# Patient Record
Sex: FEMALE | Race: AMERICAN INDIAN OR ALASKA NATIVE
[De-identification: names, ages, dates, MRNs, and addresses within clinical notes are randomized per-mention and may not be internally consistent; named-entity substitution may affect disease eponyms.]

---

## 2017-06-21 ENCOUNTER — HOSPITAL ENCOUNTER (EMERGENCY)
Dept: HOSPITAL 5 - ED | Age: 27
Discharge: HOME | End: 2017-06-21
Payer: COMMERCIAL

## 2017-06-21 VITALS — SYSTOLIC BLOOD PRESSURE: 110 MMHG | DIASTOLIC BLOOD PRESSURE: 81 MMHG
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
## 2017-06-21 DIAGNOSIS — R51: ICD-10-CM

## 2017-06-21 DIAGNOSIS — M62.830: ICD-10-CM

## 2017-06-21 DIAGNOSIS — J45.909: ICD-10-CM
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
## 2017-06-21 DIAGNOSIS — M54.5: Primary | ICD-10-CM

## 2017-06-21 PROCEDURE — 99282 EMERGENCY DEPT VISIT SF MDM: CPT
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
## 2019-08-28 ENCOUNTER — HOSPITAL ENCOUNTER (EMERGENCY)
Dept: HOSPITAL 5 - ED | Age: 29
Discharge: HOME | End: 2019-08-28
Payer: MEDICAID

## 2019-08-28 VITALS — DIASTOLIC BLOOD PRESSURE: 78 MMHG | SYSTOLIC BLOOD PRESSURE: 121 MMHG

## 2019-08-28 DIAGNOSIS — J45.909: ICD-10-CM

## 2019-08-28 DIAGNOSIS — Z88.1: ICD-10-CM

## 2019-08-28 DIAGNOSIS — L02.414: Primary | ICD-10-CM

## 2019-08-28 DIAGNOSIS — Z79.899: ICD-10-CM

## 2019-08-28 PROCEDURE — 99282 EMERGENCY DEPT VISIT SF MDM: CPT

## 2019-08-28 NOTE — EMERGENCY DEPARTMENT REPORT
ED General Adult HPI





- General


Chief complaint: Extremity Injury, Upper


Stated complaint: SPIDER BITE


Time Seen by Provider: 08/28/19 09:34


Source: patient


Mode of arrival: Ambulatory


Limitations: No Limitations





- History of Present Illness


Initial comments: 





Patient presents to emergency department for she thinks is a spider bite to her 

left forearm.  Patient states that she does not remember a spider bite her but 

noticed a bump on her forearm yesterday and became concerned


-: Sudden


Improves with: none


Worsens with: none


Associated Symptoms: denies other symptoms


Treatments Prior to Arrival: none





- Related Data


                                  Previous Rx's











 Medication  Instructions  Recorded  Last Taken  Type


 


metroNIDAZOLE [Flagyl] 500 mg PO BID #14 tablet 09/08/13 Unknown Rx


 


Tobramycin 0.3% [Tobrex] 1 drop OU Q4HR #1 bottle 05/09/16 Unknown Rx


 


Cyclobenzaprine [Flexeril] 10 mg PO TID PRN #15 tablet 06/21/17 Unknown Rx


 


Ibuprofen [Motrin] 600 mg PO Q8H PRN #15 tablet 06/21/17 Unknown Rx


 


cephALEXin [Keflex] 500 mg PO Q6HR #28 capsule 08/28/19 Unknown Rx











                                    Allergies











Allergy/AdvReac Type Severity Reaction Status Date / Time


 


clindamycin Allergy  Hives Verified 08/28/19 08:34














ED Review of Systems


ROS: 


Stated complaint: SPIDER BITE


Other details as noted in HPI





Comment: All other systems reviewed and negative


Constitutional: denies: chills, fever


Eyes: denies: eye pain, eye discharge, vision change


ENT: denies: ear pain, throat pain


Respiratory: denies: cough, shortness of breath, wheezing


Cardiovascular: denies: chest pain, palpitations


Endocrine: no symptoms reported


Gastrointestinal: denies: abdominal pain, nausea, diarrhea


Genitourinary: denies: urgency, dysuria, discharge


Musculoskeletal: denies: back pain, joint swelling, arthralgia


Skin: denies: rash, lesions


Neurological: denies: headache, weakness, paresthesias


Psychiatric: denies: anxiety, depression


Hematological/Lymphatic: denies: easy bleeding, easy bruising





ED Past Medical Hx





- Past Medical History


Hx Hypertension: No


Hx Congestive Heart Failure: No


Hx Diabetes: No


Hx Deep Vein Thrombosis: No


Hx Renal Disease: No


Hx Sickle Cell Disease: No


Hx Seizures: No


Hx Asthma: Yes (as a child)


Hx COPD: No


Hx HIV: No





- Surgical History


Additional Surgical History: back surgery for scolosis





- Social History


Smoking Status: Never Smoker


Substance Use Type: None





- Medications


Home Medications: 


                                Home Medications











 Medication  Instructions  Recorded  Confirmed  Last Taken  Type


 


metroNIDAZOLE [Flagyl] 500 mg PO BID #14 tablet 09/08/13 08/18/16 Unknown Rx


 


Tobramycin 0.3% [Tobrex] 1 drop OU Q4HR #1 bottle 05/09/16 08/18/16 Unknown Rx


 


Cyclobenzaprine [Flexeril] 10 mg PO TID PRN #15 tablet 06/21/17  Unknown Rx


 


Ibuprofen [Motrin] 600 mg PO Q8H PRN #15 tablet 06/21/17  Unknown Rx


 


cephALEXin [Keflex] 500 mg PO Q6HR #28 capsule 08/28/19  Unknown Rx














ED Physical Exam





- General


Limitations: No Limitations


General appearance: alert, in no apparent distress





- Head


Head exam: Present: atraumatic, normocephalic





- Eye


Eye exam: Present: normal appearance





- ENT


ENT exam: Present: mucous membranes moist





- Neck


Neck exam: Present: normal inspection





- Respiratory


Respiratory exam: Present: normal lung sounds bilaterally.  Absent: respiratory 

distress





- Cardiovascular


Cardiovascular Exam: Present: regular rate, normal rhythm.  Absent: systolic 

murmur, diastolic murmur, rubs, gallop





- Extremities Exam


Extremities exam: Present: normal inspection, other (physical small abscess that

 is draining to the left antebrachium palmar aspect)





- Back Exam


Back exam: Present: normal inspection





- Neurological Exam


Neurological exam: Present: alert, oriented X3, CN II-XII intact.  Absent: motor

 sensory deficit





- Psychiatric


Psychiatric exam: Present: normal affect, normal mood





- Skin


Skin exam: Present: warm, dry, intact, normal color.  Absent: rash





ED Course





                                   Vital Signs











  08/28/19





  08:46


 


Temperature 97.8 F


 


Pulse Rate 83


 


Respiratory 16





Rate 


 


Blood Pressure 124/80


 


O2 Sat by Pulse 100





Oximetry 














ED Medical Decision Making





- Medical Decision Making





Abscess does not require I and D


Critical care attestation.: 


If time is entered above; I have spent that time in minutes in the direct care 

of this critically ill patient, excluding procedure time.








ED Disposition


Clinical Impression: 


 Abscess





Disposition: DC-01 TO HOME OR SELFCARE


Is pt being admited?: No


Does the pt Need Aspirin: No


Condition: Stable


Instructions:  Abscess (ED)


Additional Instructions: 


return if worse


Prescriptions: 


cephALEXin [Keflex] 500 mg PO Q6HR #28 capsule


Referrals: 


[facility],MD [Primary Care Provider] - 3-5 Days


[facility] [Provider Group] - 3-5 Days


[facility] [Provider Group] - 3-5 Days


Time of Disposition: 09:47

## 2019-12-11 ENCOUNTER — HOSPITAL ENCOUNTER (OUTPATIENT)
Dept: HOSPITAL 5 - TRG | Age: 29
Discharge: HOME | End: 2019-12-11
Attending: OBSTETRICS & GYNECOLOGY
Payer: MEDICAID

## 2019-12-11 VITALS — SYSTOLIC BLOOD PRESSURE: 110 MMHG | DIASTOLIC BLOOD PRESSURE: 70 MMHG

## 2019-12-11 DIAGNOSIS — Z3A.22: ICD-10-CM

## 2019-12-11 DIAGNOSIS — O99.512: ICD-10-CM
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
## 2019-12-11 DIAGNOSIS — O47.02: Primary | ICD-10-CM

## 2019-12-11 DIAGNOSIS — J45.909: ICD-10-CM
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
## 2019-12-11 LAB
BILIRUB UR QL STRIP: (no result)
BLOOD UR QL VISUAL: (no result)
PH UR STRIP: 7 [PH] (ref 5–7)
PROT UR STRIP-MCNC: (no result) MG/DL
RBC #/AREA URNS HPF: 3 /HPF (ref 0–6)
UROBILINOGEN UR-MCNC: < 2 MG/DL (ref ?–2)
WBC #/AREA URNS HPF: 6 /HPF (ref 0–6)

## 2019-12-11 PROCEDURE — 76815 OB US LIMITED FETUS(S): CPT

## 2019-12-11 PROCEDURE — 76817 TRANSVAGINAL US OBSTETRIC: CPT

## 2019-12-11 PROCEDURE — 81001 URINALYSIS AUTO W/SCOPE: CPT

## 2019-12-11 PROCEDURE — 96360 HYDRATION IV INFUSION INIT: CPT

## 2019-12-11 PROCEDURE — 96361 HYDRATE IV INFUSION ADD-ON: CPT

## 2019-12-11 NOTE — ULTRASOUND REPORT
Limited OB Ultrasound



HISTORY: f/u fall.



TECHNIQUE:  Grayscale and color Doppler  imaging performed.



COMPARISON:  None



FINDINGS: There is a single viable intrauterine gestation which is cephalic in presentation with hear
t rate of 149 bpm. Placenta is positioned anteriorly. No acute abnormality identified. Cervical lengt
h is 4.9 cm.



IMPRESSION: No acute abnormality. Single viable intrauterine gestation as above.



Signer Name: Alexsander Ramirez MD 

Signed: 12/11/2019 5:34 PM

 [facility]: [id number]

## 2019-12-12 NOTE — ULTRASOUND REPORT
Ultrasound OB transvaginal



HISTORY: Status post fall.



FINDINGS: This exam is just presented to me for interpretation. A single transvaginal ultrasound imag
es of the cervix is presented. The cervix measures 4.9 cm in length.



Signer Name: Hebert Womack Jr, MD 

Signed: 12/12/2019 8:27 AM

 Workstation Name: [id number]

## 2020-04-03 NOTE — ANESTHESIA CONSULTATION
Anesthesia Consult and Med Hx


Date of service: 04/03/20





- Airway


Anesthetic Teeth Evaluation: Good


ROM Head & Neck: Adequate


Mental/Hyoid Distance: Adequate


Mallampati Class: Class II


Intubation Access Assessment: Good





- Pulmonary Exam


CTA: Yes





- Cardiac Exam


Cardiac Exam: RRR





- Pre-Operative Health Status


ASA Pre-Surgery Classification: ASA2, Emergency


Proposed Anesthetic Plan: Epidural





- Pulmonary


Hx Asthma: Yes (as a child)


COPD: No


Hx Pneumonia: No





- Cardiovascular System


Hx Hypertension: No





- Central Nervous System


Hx Seizures: No


Hx Psychiatric Problems: No





- Endocrine


Hx Renal Disease: No


Hx End Stage Renal Disease: No


Hx Hypothyroidism: No


Hx Hyperthyroidism: No





- Hematic


Hx Anemia: Yes


Hx Sickle Cell Disease: No





- Other Systems


Hx Alcohol Use: No

## 2020-04-03 NOTE — HISTORY AND PHYSICAL REPORT
History of Present Illness


Date of examination: 20


Date of admission: 


20 09:49





Chief complaint: 





Irregular ctxs


History of present illness: 





28 yo,  at 38.1 wks, initiated care with [facility] at 12.5 wks 

gestation.  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

35 wks), Scoliosis, Vit D deficiency and GBS positive status.  She presents to 

[facility] with reports of irregular painful ctxs since last night.  She reports +FM. 

Denies VB or LOF.





Labs:


O+, antibody negative; PAP smear normal; Rubella imuune; VDRL non-reactive; 

urine screen negative; HBsAg negative; HIV negative; platelets - 297K; Vit D - 

20.9; varicella immune; HCV Ab negative; MSAFP negative; 1 hr gtt - 151; 3 hr 

gtt: 87;128;97;90; GBS positive.





Past History


Past Medical History: no pertinent history


Past Surgical History: other (Elective  - ; Back surgery - )


GYN History: chlamydia (2012), herpes


Family/Genetic History: diabetes (MGM), hypertension (mother)


Social history: single, lives with family, full code.  denies: smoking, alcohol 

abuse, prescription drug abuse, IV drug use





- Obstetrical History


Expected Date of Delivery: 20


Actual Gestation: 38 Week(s) 3 Day(s) 


: 5


Para: 3


Hx # Term Pregnancies: 3


Number of  Pregnancies: 0


Spontaneous Abortions: 0


Induced : 1


Number of Living Children: 3


  ** #1


Infant Gender: Female


Birth year: [birth year]


Birthweight: 2.931 kg


Method of Delivery: Vaginal


Gestational age at delivery: 39


Complications: none





  ** #2


Infant Gender: Male


Birth year: [birth year]


Birthweight: 3.345 kg


Method of Delivery: Vaginal


Gestational age at delivery: 37


Complications: none





  ** #3


Infant Gender: Male


Birth year: [birth year]


Birthweight: 3.43 kg


Method of Delivery: Vaginal


Gestational age at delivery: 37


Complications: none





Medications and Allergies


                                    Allergies











Allergy/AdvReac Type Severity Reaction Status Date / Time


 


clindamycin AdvReac Severe Hives Verified 19 15:23











                                Home Medications











 Medication  Instructions  Recorded  Confirmed  Last Taken  Type


 


metroNIDAZOLE [Flagyl] 500 mg PO BID #14 tablet 13 Unknown Rx


 


Tobramycin 0.3% [Tobrex] 1 drop OU Q4HR #1 bottle 16 Unknown Rx


 


Cyclobenzaprine [Flexeril] 10 mg PO TID PRN #15 tablet 17  Unknown Rx


 


Ibuprofen [Motrin] 600 mg PO Q8H PRN #15 tablet 17  Unknown Rx


 


cephALEXin [Keflex] 500 mg PO Q6HR #28 capsule 19  Unknown Rx











Active Meds: 


Active Medications





Butorphanol Tartrate (Stadol)  2 mg IV Q2H PRN


   PRN Reason: Pain , Severe (7-10)


Ephedrine Sulfate (Ephedrine Sulfate)  10 mg IV Q2M PRN


   PRN Reason: Hypotension


Fentanyl (Sublimaze)  100 mcg IV Q2H PRN


   PRN Reason: Labor Pain


Oxytocin/Sodium Chloride (Pitocin/Ns 20 Unit/1000ml Drip)  20 units in 1,000 mls

 @ 125 mls/hr IV AS DIRECT MARIPOSA


Lactated Ringer's (Lactated Ringers)  1,000 mls @ 125 mls/hr IV AS DIRECT MARIPOSA


Ampicillin Sodium (Ampicillin/Ns 2 Gm/100 Ml)  2 gm in 100 mls @ 100 mls/hr IV 

ONCE ONE; Protocol


   Stop: 20 11:53


Ampicillin Sodium (Ampicillin/Ns 1 Gm/50 Ml)  1 gm in 50 mls @ 100 mls/hr IV 

Q4HR MARIPOSA; Protocol


Mineral Oil (Mineral Oil)  30 ml PO QHS PRN


   PRN Reason: Constipation


Ondansetron HCl (Zofran)  4 mg IV Q8H PRN


   PRN Reason: Nausea And Vomiting


Terbutaline Sulfate (Brethine)  0.25 mg SUB-Q ONCE PRN


   PRN Reason: Hyperstimulation/Hypertonicity


Terbutaline Sulfate (Brethine)  0.25 mg IVP ONCE PRN


   PRN Reason: Hyperstimulation/Hypertonicity











Review of Systems


All systems: negative


Genitourinary: contractions





- Vital Signs


Vital signs: 


                                   Vital Signs











Pulse BP


 


 96 H  106/70 


 


 20 08:51  20 08:51








                                        











Temp Pulse Resp BP Pulse Ox


 


 98.2 F   93 H  16   111/75   96 


 


 20 09:09  20 11:26  20 09:09  20 09:52  20 11:26














- Physical Exam


Breasts: Positive: normal


Cardiovascular: Regular rate


Lungs: Positive: Normal air movement


Abdomen: Positive: other (gravid)


Genitourinary (Female): Positive: normal external genitalia, normal perenium


Vagina: Positive: normal moisture


Uterus: Positive: enlarged (S=D)


Extremities: Positive: normal


Deep Tendon Reflex Grade: Normal +2





- Obstetrical


FHR: category 1


Uterine Contraction Monitor Mode: External


Cervical Dilatation: 3


Cervical Effacement Percentage: 70


Fetal station: -2


Uterine Contraction Pattern: Irregular


Uterine Tone Measurement Phase: Resting


Uterine Contraction Intensity: Mild





Results


Result Diagrams: 


                                 20 11:24





All other labs normal.








Assessment and Plan





- Patient Problems


(1) 38 weeks gestation of pregnancy


Current Visit: Yes   Status: Acute   


Plan to address problem: 


Admit to L & D


 Expectant labor management


 Pain meds as expected


 Anticipate 








(2) Positive GBS test


Current Visit: Yes   Status: Acute   


Plan to address problem: 


Initiate GBS prophylaxis per protocol








(3) HSV-2 seropositive


Current Visit: Yes   Status: Acute   





(4) Scoliosis


Current Visit: Yes   Status: Acute

## 2020-04-03 NOTE — PROGRESS NOTE
Assessment and Plan





- Patient Problems


(1) 38 weeks gestation of pregnancy


Current Visit: Yes   Status: Acute   


Plan to address problem: 


Expectant labor management


 Ambulate in hallway after second dose of ABT infused


 Pain meds as expected


 Anticipate 








(2) Positive GBS test


Current Visit: Yes   Status: Acute   


Plan to address problem: 


Continue GBS prophylaxis per protocol








(3) HSV-2 seropositive


Current Visit: Yes   Status: Acute   





(4) Scoliosis


Current Visit: Yes   Status: Acute   





Subjective





- Subjective


Date of service: 20


Principal diagnosis: labor; 38 wks gestation


Interval history: 





28 yo,  at 38.1 wks, initiated care with [facility] at 12.5 wks 

gestation.  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

35 wks), Scoliosis, Vit D deficiency and GBS positive status.  She presents to 

[facility] with reports of irregular painful ctxs since last night.  She reports +FM. 

Denies VB or LOF.





Labs:


O+, antibody negative; PAP smear normal; Rubella imuune; VDRL non-reactive; 

urine screen negative; HBsAg negative; HIV negative; platelets - 297K; Vit D - 

20.9; varicella immune; HCV Ab negative; MSAFP negative; 1 hr gtt - 151; 3 hr 

gtt: 87;128;97;90; GBS positive.


Patient reports: new complaints, fetal movement normal, contractions ("I feel th

em, but they aren't real painful"), no vaginal bleeding





Objective





- Vital Signs


Vital Signs: 


                               Vital Signs - 12hr











  20





  08:51 08:52 08:57


 


Temperature   


 


Pulse Rate 96 H 102 H 99 H


 


Respiratory   





Rate   


 


Blood Pressure 106/70  


 


O2 Sat by Pulse  100 100





Oximetry   














  20





  09:02 09:07 09:09


 


Temperature   98.2 F


 


Pulse Rate 100 H 96 H 


 


Respiratory   16





Rate   


 


Blood Pressure   


 


O2 Sat by Pulse 100 100 





Oximetry   














  20





  09:12 09:17 09:21


 


Temperature   


 


Pulse Rate 104 H 99 H 96 H


 


Respiratory   





Rate   


 


Blood Pressure   


 


O2 Sat by Pulse 100 100 93





Oximetry   














  20





  09:22 09:27 09:30


 


Temperature   


 


Pulse Rate 96 H 94 H 94 H


 


Respiratory   





Rate   


 


Blood Pressure   


 


O2 Sat by Pulse 99 97 94





Oximetry   














  20





  09:32 09:37 09:40


 


Temperature   


 


Pulse Rate 97 H 93 H 101 H


 


Respiratory   





Rate   


 


Blood Pressure   


 


O2 Sat by Pulse 95 98 91





Oximetry   














  20





  09:51 09:52 09:55


 


Temperature   


 


Pulse Rate 101 H 95 H 98 H


 


Respiratory   





Rate   


 


Blood Pressure  111/75 


 


O2 Sat by Pulse 98  99





Oximetry   














  20





  10:01 10:05 10:10


 


Temperature   


 


Pulse Rate 94 H 96 H 95 H


 


Respiratory   





Rate   


 


Blood Pressure   


 


O2 Sat by Pulse 97 97 97





Oximetry   














  20





  10:16 10:21 10:25


 


Temperature   


 


Pulse Rate 99 H 94 H 101 H


 


Respiratory   





Rate   


 


Blood Pressure   


 


O2 Sat by Pulse 97 97 98





Oximetry   














  20





  10:31 10:36 10:40


 


Temperature   


 


Pulse Rate 94 H 96 H 99 H


 


Respiratory   





Rate   


 


Blood Pressure   


 


O2 Sat by Pulse 98 98 96





Oximetry   














  20





  10:46 10:51 10:54


 


Temperature   


 


Pulse Rate 98 H 89 99 H


 


Respiratory   





Rate   


 


Blood Pressure   


 


O2 Sat by Pulse 96 96 94





Oximetry   














  20





  10:55 11:02 11:06


 


Temperature   


 


Pulse Rate 95 H 98 H 107 H


 


Respiratory   





Rate   


 


Blood Pressure   


 


O2 Sat by Pulse 95 97 95





Oximetry   














  20





  11:11 11:13 11:16


 


Temperature   


 


Pulse Rate 112 H 113 H 105 H


 


Respiratory   





Rate   


 


Blood Pressure   


 


O2 Sat by Pulse 95 93 97





Oximetry   














  20





  11:22 11:26 11:31


 


Temperature   


 


Pulse Rate 97 H 93 H 95 H


 


Respiratory   





Rate   


 


Blood Pressure   


 


O2 Sat by Pulse 94 96 97





Oximetry   














  20





  11:36 11:37 11:42


 


Temperature   


 


Pulse Rate 95 H 104 H 108 H


 


Respiratory   





Rate   


 


Blood Pressure   


 


O2 Sat by Pulse 98 87 96





Oximetry   














  20





  11:46 12:02 12:06


 


Temperature   


 


Pulse Rate 98 H 111 H 107 H


 


Respiratory   





Rate   


 


Blood Pressure   


 


O2 Sat by Pulse 95 98 97





Oximetry   














  20





  12:11 12:16 12:21


 


Temperature   


 


Pulse Rate 101 H 104 H 106 H


 


Respiratory   





Rate   


 


Blood Pressure   


 


O2 Sat by Pulse 97 97 97





Oximetry   














  20





  12:26 12:32 12:36


 


Temperature   


 


Pulse Rate 99 H 100 H 104 H


 


Respiratory   





Rate   


 


Blood Pressure   


 


O2 Sat by Pulse 97 97 98





Oximetry   














  20





  12:41 12:46 12:51


 


Temperature   


 


Pulse Rate 103 H 101 H 105 H


 


Respiratory   





Rate   


 


Blood Pressure   


 


O2 Sat by Pulse 97 96 97





Oximetry   














  20





  12:56 13:01 13:07


 


Temperature   


 


Pulse Rate 104 H 104 H 105 H


 


Respiratory   





Rate   


 


Blood Pressure   


 


O2 Sat by Pulse 95 98 96





Oximetry   














  20





  13:11 13:17 13:21


 


Temperature   


 


Pulse Rate 101 H 111 H 108 H


 


Respiratory   





Rate   


 


Blood Pressure   


 


O2 Sat by Pulse 97 97 97





Oximetry   














  20





  13:26 13:31 13:36


 


Temperature   


 


Pulse Rate 107 H 102 H 101 H


 


Respiratory   





Rate   


 


Blood Pressure   


 


O2 Sat by Pulse 97 96 96





Oximetry   














  20





  13:41 13:46 13:51


 


Temperature   


 


Pulse Rate 104 H 105 H 103 H


 


Respiratory   





Rate   


 


Blood Pressure   


 


O2 Sat by Pulse 97 97 96





Oximetry   














  20





  13:56 14:01 14:06


 


Temperature   


 


Pulse Rate 108 H 113 H 113 H


 


Respiratory   





Rate   


 


Blood Pressure   


 


O2 Sat by Pulse 97 97 97





Oximetry   














  20





  14:11 14:16 14:21


 


Temperature   


 


Pulse Rate 98 H 101 H 104 H


 


Respiratory   





Rate   


 


Blood Pressure   


 


O2 Sat by Pulse 97 97 95





Oximetry   














  20





  14:26 14:31 14:36


 


Temperature   


 


Pulse Rate 107 H 109 H 104 H


 


Respiratory   





Rate   


 


Blood Pressure   


 


O2 Sat by Pulse 96 97 97





Oximetry   














  20





  14:41 14:46 14:51


 


Temperature   


 


Pulse Rate 110 H 96 H 107 H


 


Respiratory   





Rate   


 


Blood Pressure   


 


O2 Sat by Pulse 98 98 97





Oximetry   














  20





  14:56 15:01 15:06


 


Temperature   


 


Pulse Rate 107 H 96 H 93 H


 


Respiratory   





Rate   


 


Blood Pressure   


 


O2 Sat by Pulse 95 97 96





Oximetry   














  20





  15:11 15:18 15:23


 


Temperature   


 


Pulse Rate 117 H 85 99 H


 


Respiratory   





Rate   


 


Blood Pressure   


 


O2 Sat by Pulse 96 98 97





Oximetry   














  20





  15:28 15:33 15:38


 


Temperature   


 


Pulse Rate 92 H 98 H 92 H


 


Respiratory   





Rate   


 


Blood Pressure   


 


O2 Sat by Pulse 96 97 96





Oximetry   














  20





  15:43 15:48 15:53


 


Temperature   


 


Pulse Rate 93 H 87 120 H


 


Respiratory   





Rate   


 


Blood Pressure   


 


O2 Sat by Pulse 95 96 98





Oximetry   














  20





  15:58 16:03 16:08


 


Temperature   


 


Pulse Rate 95 H 96 H 99 H


 


Respiratory   





Rate   


 


Blood Pressure   


 


O2 Sat by Pulse 97 98 97





Oximetry   














  20





  16:13


 


Temperature 


 


Pulse Rate 97 H


 


Respiratory 





Rate 


 


Blood Pressure 


 


O2 Sat by Pulse 96





Oximetry 














- Exam


Breasts: deferred


Cardiovascular: Regular rate


Lungs: Normal air movement


FHR: category 1


Uterine Contraction Monitor Mode: External


Cervical Dilatation: 4.5 (vertex)


Cervical Effacement Percentage: 70


Fetal station: -2


Uterine Contraction Frequency (min): 4-5


Uterine Contraction Pattern: Irregular


Uterine Tone Measurement Phase: Resting


Uterine Contraction Intensity: Mild


Extremities: normal





- Labs


Labs: 


                                  Abnormal Labs











  20





  11:24


 


RBC  3.53 L


 


MCV  100 H


 


MCH  35 H


 


MCHC  35 H


 


RDW  13.1 L








                         Laboratory Results - last 24 hr











  20





  11:24 11:26


 


WBC  8.0 


 


RBC  3.53 L 


 


Hgb  12.5 


 


Hct  35.3 


 


MCV  100 H 


 


MCH  35 H 


 


MCHC  35 H 


 


RDW  13.1 L 


 


Plt Count  182 


 


Blood Type   O POSITIVE


 


Antibody Screen   Negative

## 2020-04-03 NOTE — PROGRESS NOTE
Labor Epidural





- Labor Epidural


Start Time: 21:01


Stop Time: 21:08


Performed by:: mick


Procedure: 


Patient is requesting a laboring epidural for laboring pain.  Patient IDed, H&P 

reviewed, all questions and concerns were answered, and consent was signed. 

Timeout was performed at bedside.  Patient in sitting position.  Sterile prep 

and drape was performed.  [3] ml of 1% lidocaine skin wheal at L[3]- L [4].  18-

gauge Touhy epidural needle was advanced to loss of resistance with air 

technique.  Negative CSF negative blood.  Epidural catheter advanced to [15] 

centimeters.  [-] Aspiration [-] test dose.  Sterile dressing applied.  Patient 

tolerated procedure.

## 2020-04-03 NOTE — PROCEDURE NOTE
OB Delivery Note





- Delivery


Date of Delivery: 20


Surgeon: DWIGHT ADAMS


Estimated blood loss: 300cc





- Vaginal


Delivery presentation: vertex


Delivery position: OA


Delivery induction: none


Delivery augmentation: rupture of membranes


Delivery monitor: external FHT, external uterine


Route of delivery: 


Delivery placenta: spontaneous


Delivery cord: 3 umbilical vessels


Episiotomy: none


Delivery laceration: none


Anesthesia: epidural


Delivery comments: 


Spontaneous vaginal delivery at 22:16 of liveborn female infant weighing 2.95 kg

over intact perineum with apgars of 8/9. Baby delivered easily and placed skin 

to skin with mom immediately after birth. Spontaneous cry and respirations. Baby

dried and bulb suctioned. 3 vessel cord double clamped and cut after cessation 

of pulsation. Cord blood obtained. Spontaneous delivery of intact placenta and 

membranes at 22:25 by carlos mechanism.  cc. Pitocin to IV fluids and 

Cytotec 800 mcg per rectum given to control bleeding. Fundus firm and midline. 

No lacerations noted. Vaginal sweep negative. Sponge count correct.

## 2020-04-04 NOTE — ULTRASOUND REPORT
US pelvic limited



INDICATION / CLINICAL INFORMATION:

Check for retained placenta.



COMPARISON:

12/11/2019



FINDINGS:

The uterus was evaluated for possible retained placenta.

Inhomogeneous solid density is confirmed in the lower uterine segment consistent with retained produc
ts of conception.



Signer Name: Isra Ross MD 

Signed: 4/4/2020 2:07 AM

Workstation Name: [id number]

## 2020-04-04 NOTE — PROGRESS NOTE
Assessment and Plan


A: Postpartum day 1 S/P .


Retained placental lobe.


P: Oral Methergine series started. 


Will observe closely for bleeding.


Will repeat CBC this afternoon.








Subjective





- Subjective


Date of service: 20


Principal diagnosis: Postpartum day1


Interval history: 


Patient had a  last night. She had 2 episodes of passing moderate sized 

clots but otherwise had normal amount of lochia no heavy bleeding. Ultrasound 

was ordered which showed retained placenta. This is possibly an accessory lobe 

as placenta appeared intact when it delivered. Had consulted with Dr. Lala re: 

this around 1 AM and he advised that IV Pitocin is sufficient if no heavy 

bleeding. Over the rest of the night, the patient has not had any heavy bleeding

and no large clots. However the accessory lobe has not passed. Consulted with 

Dr. Cruz re: this. Oral Methergine series ordered. Will leave patient in 

L&D until this lobe passes to observe for bleeding. Discussed this plan with 

patient. 





Patient reports: appetite normal, voiding normally, pain well controlled, 

flatus, ambulating normally, no dizzy ambulation, no nauseated


[place]: doing well





Objective





- Vital Signs


Latest vital signs: 


                                   Vital Signs











  Temp Pulse Resp BP BP Pulse Ox


 


 20 01:38   86   110/77   86


 


 20 01:37   90     98


 


 20 01:35   92 H     97


 


 20 01:33   92 H     98


 


 20 01:31   107 H     100


 


 20 01:30  97.7 F  93 H  18   110/77  98


 


 20 01:29   92 H     98


 


 20 01:27   93 H   106/74   100


 


 20 01:25   89     98


 


 20 01:23   91 H     97


 


 20 01:21   96 H     98


 


 20 01:19   99 H     99


 


 20 01:17   92 H     99


 


 20 01:15   103 H     99


 


 20 01:13   90     99


 


 20 01:11   90     98


 


 20 01:09   97 H     98


 


 20 01:07   91 H     98


 


 20 01:05   91 H     98


 


 20 01:03   91 H     98


 


 20 01:01   99 H     98


 


 20 00:59   96 H     99


 


 20 00:57   96 H     99


 


 20 00:56   107 H   113/56  


 


 20 00:55   97 H     100


 


 20 00:53   94 H     100


 


 20 00:51   87     99


 


 20 00:49   99 H     99


 


 20 00:47   95 H     98


 


 20 00:45   87     99


 


 20 00:43   89     99


 


 20 00:41   97 H   115/75   99


 


 20 00:39   97 H     98


 


 20 00:37   102 H     99


 


 20 00:35   98 H     99


 


 20 00:33   99 H     98


 


 20 00:31   105 H     98


 


 20 00:29   94 H     99


 


 20 00:27   101 H     99


 


 20 00:26   93 H   116/84  


 


 20 00:25   92 H     99


 


 20 00:23   90     99


 


 20 00:21   89     99


 


 20 00:19   105 H     100


 


 20 00:17   97 H     100


 


 20 00:15   106 H     100


 


 20 00:13   93 H     100


 


 20 00:11   102 H   116/72   99


 


 20 00:09   102 H     99


 


 20 00:07   104 H     98


 


 20 00:05   94 H     98


 


 20 00:03   100 H     98


 


 20 00:01   94 H     98


 


 20 23:59   95 H     98


 


 20 23:57   94 H     99


 


 20 23:56   96 H   120/68  


 


 20 23:55   98 H     99


 


 20 23:53   103 H     100


 


 20 23:51   116 H     100


 


 20 23:49   107 H     99


 


 20 23:47   107 H   117/77   99


 


 20 23:45   101 H     99


 


 20 23:43   100 H     99


 


 20 23:41   94 H     99


 


 20 23:39   98 H     99


 


 20 23:37   100 H     99


 


 20 23:35   98 H     99


 


 20 23:33   103 H     99


 


 20 23:31   100 H     99


 


 20 23:29   111 H     100


 


 20 23:27   116 H     99


 


 20 23:26  97.7 F  111 H  18  115/83  115/83  99


 


 20 23:25   102 H     99


 


 20 23:23   107 H     99


 


 20 23:21   103 H     99


 


 20 23:19   111 H     99


 


 20 23:17   109 H     99


 


 20 23:15   108 H     99


 


 20 23:13   104 H     98


 


 20 23:12   99 H   110/83  


 


 20 23:11   101 H     98


 


 20 23:09   100 H     98


 


 20 23:07   115 H     97


 


 20 23:05   100 H     98


 


 20 23:03   110 H     97


 


 20 23:01   106 H     99


 


 20 22:59   104 H     99


 


 20 22:57   106 H     99


 


 20 22:55   102 H     99


 


 20 22:53   104 H     99


 


 20 22:51   111 H     98


 


 20 22:49   110 H     98


 


 20 22:47   113 H     97


 


 20 22:45   115 H     97


 


 20 22:43   108 H     98


 


 20 22:41   107 H   136/65   98


 


 20 22:39   112 H     98


 


 20 22:37   105 H     100


 


 03 22:35   110 H     100


 


 20 22:33   123 H     100


 


 0320 22:31   121 H     100


 


 20 22:30  98.7 F  104 H  20   150/64  100


 


 0320 22:29   115 H     100


 


 03 22:27   116 H   150/64   100


 


 0320 22:25   130 H     100


 


 0320 22:23   109 H     100


 


 20 22:21   117 H     98


 


 20 22:19   119 H     100


 


 20 22:17   125 H     100


 


 20 22:15   129 H     100


 


 20 22:13   127 H     100


 


 20 22:11   112 H     100


 


 20 22:09   120 H     100


 


 20 22:07   127 H   120/72   100


 


 20 22:05   130 H     99


 


 20 22:03   131 H     98


 


 20 22:01   120 H     100


 


 20 21:59   133 H     99


 


 20 21:58   115 H   133/74  


 


 20 21:57   122 H     100


 


 20 21:55   125 H     100


 


 20 21:53   107 H     100


 


 20 21:51   114 H     100


 


 20 21:49   113 H     100


 


 20 21:47   113 H     100


 


 20 21:45   112 H     98


 


 20 21:44   106 H   113/70  


 


 20 21:43   109 H     100


 


 20 21:42   109 H   117/72  


 


 20 21:41   102 H     99


 


 20 21:40   104 H   112/70  


 


 20 21:39   109 H     100


 


 20 21:38   107 H   114/69  


 


 20 21:37   111 H     99


 


 20 21:36   100 H   108/69  


 


 20 21:35   101 H     100


 


 20 21:34   121 H   110/64  


 


 20 21:33   102 H     99


 


 20 21:32   95 H   110/72  


 


 20 21:31   105 H     100


 


 20 21:30   103 H   111/72  


 


 20 21:29   106 H     100


 


 20 21:28   108 H   113/70  


 


 20 21:27   120 H     99


 


 20 21:26   100 H   112/70  


 


 20 21:25   101 H     100


 


 20 21:24   92 H   115/72  


 


 20 21:23   98 H     100


 


 20 21:22   94 H   105/68  


 


 20 21:21   98 H     100


 


 20 21:20   104 H   103/70  


 


 20 21:19   108 H     100


 


 04/03/20 21:18   94 H   111/64  


 


 20 21:17   99 H     100


 


 20 21:16   97 H   115/69  


 


 20 21:15   98 H     97


 


 20 21:14   91 H   115/66  


 


 20 21:13   91 H     100


 


 20 21:12   103 H   113/65  


 


 20 21:10   107 H   116/69   99


 


 20 21:08   100 H   111/65  


 


 20 21:06   112 H   115/71  


 


 20 21:05   117 H     98


 


 20 21:00   111 H     99


 


 20 19:15  98.2 F  88  18   118/75  97


 


 20 19:12   99 H   118/75  


 


 20 19:10   90     99


 


 20 17:08   103 H     97


 


 20 17:03   94 H     97


 


 20 16:58   88     97


 


 20 16:53   90     97


 


 20 16:48   96 H     97


 


 20 16:43   97 H     98


 


 20 16:38   100 H     97


 


 20 16:33   94 H     97


 


 20 16:28   95 H     96


 


 20 16:23   95 H     97


 


 20 16:18   105 H     97


 


 20 16:13   97 H     96


 


 20 16:08   99 H     97


 


 20 16:03   96 H     98


 


 20 15:58   95 H     97


 


 20 15:53   120 H     98


 


 20 15:48   87     96


 


 20 15:43   93 H     95


 


 20 15:38   92 H     96


 


 20 15:33   98 H     97


 


 20 15:28   92 H     96


 


 20 15:23   99 H     97


 


 20 15:18   85     98


 


 20 15:11   117 H     96


 


 20 15:06   93 H     96


 


 20 15:01   96 H     97


 


 20 14:56   107 H     95


 


 20 14:51   107 H     97


 


 20 14:46   96 H     98


 


 20 14:41   110 H     98


 


 20 14:36   104 H     97


 


 20 14:31   109 H     97


 


 20 14:26   107 H     96


 


 20 14:21   104 H     95


 


 20 14:16   101 H     97


 


 20 14:11   98 H     97


 


 20 14:06   113 H     97


 


 20 14:01   113 H     97


 


 20 13:56   108 H     97


 


 20 13:51   103 H     96


 


 20 13:46   105 H     97


 


 20 13:41   104 H     97


 


 20 13:36   101 H     96


 


 20 13:31   102 H     96


 


 20 13:26   107 H     97


 


 20 13:21   108 H     97


 


 20 13:17   111 H     97


 


 20 13:11   101 H     97


 


 20 13:07   105 H     96


 


 20 13:01   104 H     98


 


 20 12:56   104 H     95


 


 20 12:51   105 H     97


 


 20 12:46   101 H     96


 


 20 12:41   103 H     97


 


 20 12:36   104 H     98


 


 20 12:32   100 H     97


 


 20 12:26   99 H     97


 


 20 12:21   106 H     97


 


 20 12:16   104 H     97


 


 20 12:11   101 H     97


 


 20 12:06   107 H     97


 


 20 12:02   111 H     98


 


 20 11:46   98 H     95


 


 20 11:42   108 H     96


 


 20 11:37   104 H     87


 


 20 11:36   95 H     98


 


 20 11:31   95 H     97


 


 20 11:26   93 H     96


 


 20 11:22   97 H     94


 


 20 11:16   105 H     97


 


 20 11:13   113 H     93


 


 20 11:11   112 H     95


 


 20 11:06   107 H     95


 


 20 11:02   98 H     97


 


 20 10:55   95 H     95


 


 20 10:54   99 H     94


 


 20 10:51   89     96


 


 20 10:46   98 H     96


 


 20 10:40   99 H     96


 


 20 10:36   96 H     98


 


 20 10:31   94 H     98


 


 20 10:25   101 H     98


 


 20 10:21   94 H     97


 


 20 10:16   99 H     97


 


 20 10:10   95 H     97


 


 20 10:05   96 H     97


 


 20 10:01   94 H     97


 


 20 09:55   98 H     99


 


 20 09:52   95 H   111/75  


 


 20 09:51   101 H     98








                                Intake and Output











 20





 23:59 07:59 15:59


 


Intake Total 50 1000 


 


Output Total 540 510 


 


Balance -490 490 


 


Intake:   


 


  IV 50 1000 


 


    AMPICILLIN/NS 1 GM/50 ML 50  





    1 gm In 50 ml @ 100 mls/   





    hr IV Q4HR [facility] Rx#:   





    [id number]   


 


    PITOCin/NS 20 UNIT/1000ML  1000 





    DRIP 20 units In 1,000   





    ml @ 125 mls/hr IV AS   





    DIRECT [facility] Rx#:[id number]   


 


Output:   


 


  Urine 200 400 


 


    Indwelling Catheter 200  


 


    Void  400 


 


  Other 340 110 


 


Other:   


 


  Total, Output Amount 124 200 














- Exam


Abdomen: Present: normal appearance, soft.  Absent: distention, tenderness, 

guarding, rigidity


Uterus: Present: normal, firm, fundal height below umbilicus.  Absent: 

bogginess, tenderness


Extremities: Present: normal





- Labs


Labs: 


                              Abnormal lab results











  20 Range/Units





  11:24 Unknown Unknown 


 


RBC  3.53 L  3.50 L   (3.65-5.03)  M/mm3


 


MCV  100 H  101 H   (79-97)  fl


 


MCH  35 H  36 H   (28-32)  pg


 


MCHC  35 H  36 H   (30-34)  %


 


RDW  13.1 L  13.1 L   (13.2-15.2)  %


 


Seg Neuts % (Manual)   73.0 H   (40.0-70.0)  %


 


Monocytes % (Manual)   9.0 H   (0.0-7.3)  %


 


Carbon Dioxide    21 L  (22-30)  mmol/L


 


Creatinine    0.4 L  (0.7-1.2)  mg/dL


 


Alkaline Phosphatase    165 H  ()  units/L

## 2020-04-05 NOTE — PROGRESS NOTE
Assessment and Plan


A: Postpartum day 2 S/P . Retained placenta. 


P: Continue oral Methergine. Continue to observe for bleeding. Repeat WBC later 

this afternoon. 








Subjective





- Subjective


Date of service: 20


Principal diagnosis: Postpartum day 2 S/P 


Interval history: 


Postpartum day 2 S/P . Retained placenta per US; pt. receiving oral 

Methergine. 


Doing well. Patient reports small amount of lochia. She denies excessive bleed

ing. She denies large clots.


Patient is voiding without difficulty. She denies dizziness. 


Patient reports: appetite normal, voiding normally, pain well controlled, 

flatus, ambulating normally, no dizzy ambulation, no nauseated


[place]: doing well





Objective





- Vital Signs


Latest vital signs: 


                                   Vital Signs











  Temp Pulse Resp BP BP Pulse Ox


 


 20 08:05  97.8 F  71  20  107/71   98


 


 20 00:09  98.2 F  82  20  115/76   97


 


 20 18:36  98.9 F  82  20   117/79  98


 


 20 16:24  98.8 F   16   


 


 20 16:23   76   115/75  








                                Intake and Output











 20





 23:59 07:59 15:59


 


Intake Total 120 240 120


 


Output Total 400 450 


 


Balance -280 -210 120


 


Intake:   


 


  Oral 120 240 120


 


Output:   


 


  Urine 400 450 


 


    Void 400 450 


 


Other:   


 


  Total, Intake Amount 120 240 120


 


  Total, Output Amount 400 450 


 


  # Voids   


 


    Void   1














- Exam


Cardiovascular: Present: Regular rate, Normal S1, Normal S2


Lungs: Present: Clear to auscultation


Abdomen: Present: normal appearance, soft.  Absent: distention, tenderness, 

guarding, rigidity


Uterus: Present: normal, firm, fundal height below umbilicus.  Absent: 

bogginess, tenderness


Extremities: Present: normal





- Labs


Labs: 


                              Abnormal lab results











  20 Range/Units





  15:07 


 


WBC  13.4 H  (4.5-11.0)  K/mm3


 


RBC  3.13 L  (3.65-5.03)  M/mm3


 


MCV  101 H  (79-97)  fl


 


MCH  36 H  (28-32)  pg


 


MCHC  35 H  (30-34)  %


 


RDW  12.9 L  (13.2-15.2)  %


 


Lymph % (Auto)  11.6 L  (13.4-35.0)  %


 


Mono % (Auto)  7.9 H  (0.0-7.3)  %


 


Mono #  1.0 H  (0.0-0.8)  K/mm3


 


Seg Neutrophils %  80.1 H  (40.0-70.0)  %


 


Seg Neutrophils #  10.7 H  (1.8-7.7)  K/mm3

## 2020-04-06 NOTE — PROGRESS NOTE
Assessment and Plan





- Patient Problems


(1) Status post normal vaginal delivery


Current Visit: Yes   Status: Acute   


Plan to address problem: 


PPD 3 - retained POC


continue routine postpartum orders


Anticipate discharge to home later today based on US findings.








(2) Single live birth


Current Visit: Yes   Status: Acute   





(3) Retained placenta


Current Visit: Yes   Status: Acute   


Qualifiers: 


   Retained placenta detail: portions of placenta   Qualified Code(s): O73.1 - 

Retained portions of placenta and membranes, without hemorrhage   


Plan to address problem: 


On Methergine 0.2mg PO q6h


Repeat pelvic US pending








Subjective





- Subjective


Date of service: 20


Principal diagnosis: PPD #3; s/p  complicated by Retained Placenta


Interval history: 





see OB/GYN H&P, OB Progress Notes, Event Note, OB Delivery Procedure Note and 

PP/GYN Progress Notes


Patient reports: appetite normal, voiding normally, pain well controlled, 

ambulating normally, other (denies heavy bleeding or passing blood clots), no 

dizzy ambulation


: doing well, bottle feeding





Objective





- Vital Signs


Latest vital signs: 


                                   Vital Signs











  Temp Pulse Resp BP Pulse Ox


 


 20 08:14  97.7 F  67  18  117/77  98


 


 20 00:23  98.4 F  66  18  105/75  99


 


 20 16:19  97.4 F L  85  20  103/67  98


 


 20 16:11    20  








                                Intake and Output











 20





 23:59 07:59 15:59


 


Intake Total 240 600 


 


Balance 240 600 


 


Intake:   


 


  Oral 240  


 


  Intake, Free Water  600 


 


Other:   


 


  Total, Intake Amount 240  


 


  # Voids   


 


    Void 1 1 














- Exam


Cardiovascular: Present: Regular rate


Lungs: Present: Clear to auscultation


Abdomen: Present: normal appearance, soft


Vulva: both: normal


Uterus: Present: normal, firm, fundal height below umbilicus


Extremities: Present: normal


Comments: 





scant lochia





- Labs


Labs: 


                              Abnormal lab results











  20 Range/Units





  15:25 


 


RBC  3.38 L  (3.65-5.03)  M/mm3


 


MCV  102 H  (79-97)  fl


 


MCH  36 H  (28-32)  pg


 


MCHC  35 H  (30-34)  %


 


RDW  13.1 L  (13.2-15.2)  %


 


Mono % (Auto)  9.3 H  (0.0-7.3)  %

## 2020-04-06 NOTE — EVENT NOTE
Date: 04/06/20





Consulted Dr. Lala to review the patient's pelvic ultrasound reports and plan 

of care. Since no heavy bleeding or passing blood clots, he verbalized it is ok 

to discharge the patient home with bleeding precautions.

## 2020-04-06 NOTE — ULTRASOUND REPORT
ULTRASOUND PELVIC COMPLETE



HISTORY: Reevaluate for retained placenta.



TECHNIQUE: Transabdominal imaging with color Doppler interrogation.



COMPARISON: 4/4/2020.



FINDINGS:



Enlarged postpartum uterus is again noted measuring 17 x 8 x 10 cm. No uterine mass is identified.



There is persistent abnormal thickening and complexity in the lower uterine segment which measures up
 to 3.1 cm in thickness. The endometrium near the uterine fundus is within normal limits measuring le
ss than 1 cm. This presumably represents retained products of conception which does appear decreased 
since the previous exam. The cervix is unremarkable.



The right ovary measures 3.6 x 1.7 x 3.3 cm. The left ovary measures 3.5 x 1.7 x 3.2 cm. No adnexal c
yst or mass.



No pelvic fluid collection.



IMPRESSION:

Retained products of conception are suspected in the lower uterine segment as outlined above.



Signer Name: Hebert Womack Jr, MD 

Signed: 4/6/2020 11:23 AM

Workstation Name: [id number]

## 2020-04-06 NOTE — DISCHARGE SUMMARY
Providers





- Providers


Date of Admission: 


20 09:49





Date of discharge: 20


Attending physician: 


JOANNE BAHENA





Primary care physician: 


PRIMARY CARE MD








Hospitalization


Reason for admission: active labor, IUP at term


Delivery: 


Episiotomy: none


Laceration: none


Other postpartum procedures: none


Postpartum complications: retained placenta


Discharge diagnosis: IUP at term delivered


[place] baby: female


Hospital course: 





Complicated by retained placenta.


Condition at discharge: Stable


Disposition: [place]-01 TO HOME OR SELFCARE





- Discharge Diagnoses


(1) Status post normal vaginal delivery


Status: Acute   





(2) Single live birth


Status: Acute   





(3) Retained placenta


Status: Acute   


Qualifiers: 


   Retained placenta detail: portions of placenta   Qualified Code(s): O73.1 - 

Retained portions of placenta and membranes, without hemorrhage   


Comment: Bleeding precautions given


Follow-up at the office or ER if having heavy bleeding or a fever.   





Plan





- Provider Discharge Summary


Activity: routine, no sex for 6 weeks, no heavy lifting 4 weeks, no strenuous 

exercise


Diet: routine


Instructions: routine


Additional instructions: 


[]  Smoking cessation referral if applicable(refer to patient education folder 

for contact #)


[]  Refer to [facility] Booklet








Call your doctor immediately for:


* Fever > 100.5


* Heavy vaginal bleeding ( >1 pad per hour)


* Severe persistent headache


* Shortness of breath


* Reddened, hot, painful area to leg or breast


* Drainage or odor from incision.





* Keep incision clean and dry at all times and follow doctor's instructions 

regarding bathing/showering











- Follow up plan


Follow up: 


PRIMARY CARE,MD [Primary Care Provider] - 6 Weeks (Follow-up at [facility] 

[facility] as needed or in 6 weeks for postpartum exam)


Forms:  [facility] Discharge Summary

## 2020-06-29 NOTE — EMERGENCY DEPARTMENT REPORT
ED Eye Problem HPI





- General


Chief complaint: Eye Problems


Stated complaint: ALLERGIC REACTION


Time Seen by Provider: 06/29/20 17:45


Source: patient


Mode of arrival: Ambulatory


Limitations: No Limitations





- History of Present Illness


Initial comments: 





29-year-old -American female patient without significant prior medical 

history presents with complaints of left eye irritation and swelling x 

yesterday.  She denies any trauma to her eye, foreign bodies, or contact lens 

wearing.  Patient states she remembers cooking shellfish and may have rubbed her

eye, however she denies any known allergy to shellfish.  She reports that the 

white portion of her eye is swollen and burns a little, however denies any 

significant pain, decreased vision, headache, dizziness, or fever.  Patient also

denies any photophobia.  She states there may have been some yellow pus drainage

from the eye.





- Related Data


                                  Previous Rx's











 Medication  Instructions  Recorded  Last Taken  Type


 


metroNIDAZOLE [Flagyl] 500 mg PO BID #14 tablet 09/08/13 Unknown Rx


 


Tobramycin 0.3% [Tobrex] 1 drop OU Q4HR #1 bottle 05/09/16 Unknown Rx


 


Cyclobenzaprine [Flexeril] 10 mg PO TID PRN #15 tablet 06/21/17 Unknown Rx


 


Ibuprofen [Motrin] 600 mg PO Q8H PRN #15 tablet 06/21/17 Unknown Rx


 


cephALEXin [Keflex] 500 mg PO Q6HR #28 capsule 08/28/19 Unknown Rx


 


Polymyxin B Sulf/Trimethoprim 1 drop OP Q3HR 7 Days #1 bottle 06/29/20 Unknown 

Rx





[Polytrim Eye Drops    





53617epdms/0.1%]    


 


Prednisone [predniSONE 5 mg (6-Day 5 mg PO .TAPER #1 tab.ds.pk 06/29/20 Unknown 

Rx





Pack, 21 Tabs)]    











                                    Allergies











Allergy/AdvReac Type Severity Reaction Status Date / Time


 


clindamycin AdvReac Severe Hives Verified 12/11/19 15:23














ED Review of Systems


ROS: 


Stated complaint: ALLERGIC REACTION


Other details as noted in HPI





Constitutional: denies: chills, fever, malaise


Eyes: eye pain, eye discharge.  denies: vision change


ENT: denies: ear pain, hearing loss


Respiratory: denies: cough


Gastrointestinal: denies: nausea, vomiting


Skin: denies: rash, lesions


Neurological: denies: headache, weakness, numbness, paresthesias


Hematological/Lymphatic: denies: easy bleeding





ED Past Medical Hx





- Past Medical History


Previous Medical History?: Yes


Hx Hypertension: No


Hx Congestive Heart Failure: No


Hx Diabetes: No


Hx Deep Vein Thrombosis: No


Hx Renal Disease: No


Hx Sickle Cell Disease: No


Hx Seizures: No


Hx Asthma: Yes (as a child)


Hx COPD: No


Hx HIV: No





- Surgical History


Past Surgical History?: Yes


Additional Surgical History: back surgery for scolosis





- Social History


Smoking Status: Never Smoker


Substance Use Type: Alcohol





- Medications


Home Medications: 


                                Home Medications











 Medication  Instructions  Recorded  Confirmed  Last Taken  Type


 


metroNIDAZOLE [Flagyl] 500 mg PO BID #14 tablet 09/08/13 04/06/20 Unknown Rx


 


Tobramycin 0.3% [Tobrex] 1 drop OU Q4HR #1 bottle 05/09/16 04/06/20 Unknown Rx


 


Cyclobenzaprine [Flexeril] 10 mg PO TID PRN #15 tablet 06/21/17 04/06/20 Unknown

 Rx


 


Ibuprofen [Motrin] 600 mg PO Q8H PRN #15 tablet 06/21/17 04/06/20 Unknown Rx


 


cephALEXin [Keflex] 500 mg PO Q6HR #28 capsule 08/28/19 04/06/20 Unknown Rx


 


Polymyxin B Sulf/Trimethoprim 1 drop OP Q3HR 7 Days #1 bottle 06/29/20  Unknown 

Rx





[Polytrim Eye Drops     





65269kbiiv/0.1%]     


 


Prednisone [predniSONE 5 mg (6-Day 5 mg PO .TAPER #1 tab.ds.pk 06/29/20  Unknown

 Rx





Pack, 21 Tabs)]     














ED Physical Exam





- General


Limitations: No Limitations


General appearance: alert, in no apparent distress





- Head


Head exam: Present: atraumatic, normocephalic





- Eye


Eye exam: Present: PERRL, EOMI.  Absent: scleral icterus, conjunctival 

injection, periorbital swelling, periorbital tenderness


Pupils: Present: other (There is mild to moderate chemosis of the left 

conjunctive a without erythema or tenderness to palpation.  No purulent drainage

noted)





- ENT


ENT exam: Present: mucous membranes moist





- Neck


Neck exam: Present: normal inspection, full ROM





- Respiratory


Respiratory exam: Absent: respiratory distress





- Cardiovascular


Cardiovascular Exam: Present: regular rate





- GI/Abdominal


GI/Abdominal exam: Present: soft, normal bowel sounds





- Extremities Exam


Extremities exam: Present: normal inspection





- Neurological Exam


Neurological exam: Present: alert, oriented X3





- Psychiatric


Psychiatric exam: Present: normal affect, normal mood





- Skin


Skin exam: Present: warm, dry, intact, normal color.  Absent: rash, cyanosis, 

diaphoretic, erythema





ED Course





                                   Vital Signs











  06/29/20





  17:56


 


Temperature 99 F


 


Pulse Rate 80


 


Respiratory 18





Rate 


 


Blood Pressure 124/83


 


O2 Sat by Pulse 99





Oximetry 














ED Medical Decision Making





- Medical Decision Making





Patient here with left eye chemosis.  There there are no signs of penetrating 

trauma or periorbital/orbital cellulitis noted on exam.  Her vision is 20/20 in 

both eyes.  She is well-appearing and stable for discharge home.  Polytrim and 

prednisone given.  Recommend over-the-counter Zyrtec and cool compresses.  

Patient informed to follow-up with ophthalmology in 2 to 3 days.  Strict return 

precautions were discussed in detail with patient who verbalizes understanding.


Critical care attestation.: 


If time is entered above; I have spent that time in minutes in the direct care 

of this critically ill patient, excluding procedure time.








ED Disposition


Clinical Impression: 


 Chemosis of left conjunctiva





Disposition: DC-01 TO HOME OR SELFCARE


Is pt being admited?: No


Condition: Stable


Instructions:  Conjunctivitis (ED)


Additional Instructions: 


I recommend that you take over-the-counter Zyrtec once daily for 5 to 7 days and

also apply cold compresses to your left eye for duration of 10 minutes at a time

3 times a day


Prescriptions: 


Polymyxin B Sulf/Trimethoprim [Polytrim Eye Drops 68233razrj/0.1%] 1 drop OP 

Q3HR 7 Days #1 bottle


Prednisone [predniSONE 5 mg (6-Day Pack, 21 Tabs)] 5 mg PO .TAPER #1 tab.ds.pk


Referrals: 


DUBINER,HARVEY B, MD [Staff Physician] - 2-3 Days

## 2021-01-04 NOTE — HISTORY AND PHYSICAL REPORT
History of Present Illness


Date of examination: 21


History of present illness: 





29 yo  desires sterilization.  No other complaints.





Past History


Past Medical History: other (scoliosis)


Past Surgical History: other (, back surgery)


GYN History: chlamydia, herpes





- Obstetrical History


: 5


Hx # Term Pregnancies: 4


Induced : 1


Number of Living Children: 4





Medications and Allergies


                                    Allergies











Allergy/AdvReac Type Severity Reaction Status Date / Time


 


clindamycin AdvReac Severe Hives Verified 20 07:55











                                Home Medications











 Medication  Instructions  Recorded  Confirmed  Last Taken  Type


 


Ferrous Sulfate [Feosol] 325 mg PO QDAY 20 Unknown History


 


Valacyclovir HCl [Valacyclovir] 500 mg PO DAILY 20 Unknown 

History














Review of Systems


All systems: negative (except HPI)





- Vital Signs


Vital signs: 


see EMR charting





- Physical Exam


Cardiovascular: Regular rate, No murmurs


Lungs: Positive: Clear to auscultation


Abdomen: Positive: normal appearance, soft.  Negative: tenderness


Genitourinary (Female): Positive: other (deferred to the OR)





Results


All other labs normal.








Assessment and Plan





- Patient Problems


(1) Sterilization


Status: Acute   


Plan to address problem: 


PT is for Lap BTL on 21.  BTL papers signed on 20.  PT fully consented

 including the approximately 3/1000 risk of failure.  PT understands and accepts

 that.

## 2021-01-06 NOTE — SHORT STAY SUMMARY
Short Stay Documentation


Date of service: 01/06/21


Narrative H&P: 





Patient here today for her scheduled laparoscopic bilateral tubal ligation.  See

operative report and H&P for details.  Surgery was successful and uncomplicated.

 Patient sent home and advised to follow-up in the office 2 weeks postop





- History


H&P: dictated





- Allergies and Medications


Current Medications: 


                                    Allergies





clindamycin Adverse Reaction (Severe, Verified 12/31/20 07:55)


   Hives





                                Home Medications











 Medication  Instructions  Recorded  Confirmed  Last Taken  Type


 


Ferrous Sulfate [Feosol] 325 mg PO QDAY 12/31/20 12/31/20 01/04/21 History


 


Valacyclovir HCl [Valacyclovir] 500 mg PO DAILY 12/31/20 12/31/20 01/04/21 

History








Active Medications





Acetaminophen (Acetaminophen 500 Mg Tab)  1,000 mg PO PREOP MARIPOSA


   Stop: 01/06/21 23:00


   Last Admin: 01/06/21 08:15 Dose:  1,000 mg


   Documented by: 


Celecoxib (Celecoxib 200 Mg Cap)  200 mg PO PREOP NR


   Stop: 01/06/21 23:01


   Last Admin: 01/06/21 08:15 Dose:  200 mg


   Documented by: 


Famotidine (Famotidine 20 Mg/2 Ml Inj)  20 mg IV ONCE NR


   Stop: 01/06/21 12:00


   Last Admin: 01/06/21 08:35 Dose:  20 mg


   Documented by: 


Gabapentin (Gabapentin 300 Mg Cap)  300 mg PO PREOP NR


   Stop: 01/06/21 23:00


   Last Admin: 01/06/21 08:15 Dose:  300 mg


   Documented by: 


Hydromorphone HCl (Hydromorphone 1 Mg/1 Ml Inj)  0.5 mg IV Q10MIN PRN


   PRN Reason: Pain , Severe (7-10)


   Stop: 01/06/21 23:00


Lactated Ringer's (Lactated Ringers)  1,000 mls @ 100 mls/hr IV AS DIRECT MARIPOSA


   Stop: 01/06/21 23:59


   Last Admin: 01/06/21 08:30 Dose:  100 mls/hr


   Documented by: 


Midazolam HCl (Midazolam 2 Mg/2 Ml Inj)  2 mg IV PREOP NR


   Stop: 01/06/21 23:00


   Last Admin: 01/06/21 08:31 Dose:  2 mg


   Documented by: 


Ondansetron HCl (Ondansetron 4 Mg/2 Ml Inj)  4 mg IV ONCE PRN


   PRN Reason: Nausea And Vomiting


   Stop: 01/06/21 23:00











- Disposition


Condition at discharge: Stable


Disposition: DC-01 TO HOME OR SELFCARE





- Discharge Diagnoses


(1) Sterilization


Status: Acute   





Short Stay Discharge Plan


Follow up with: 


PRIMARY CARE,MD [Primary Care Provider] - 7 Days

## 2021-01-06 NOTE — POST OPERATIVE NOTE
Date of procedure: 21


Pre-op diagnosis: Sterilization


Post-op diagnosis: same


Findings: 





Normal anteverted uterus, normal ovaries, normal tubes.  Normal abdominal/pelvic

survey.


Procedure: 


Indication: 30-year-old -0-1-4 desires sterilization.





Procedure: Laparoscopic bilateral tubal ligation.





Patient taken the operating room and prepped and draped in usual fashion.  

Attention was first turned vaginally where single-tooth tenaculum was applied to

the anterior lip of the cervix and an acorn uterine manipulator was placed.  





Attention was now turned abdominally where a 5 mm incision was made in the 

umbilicus.  Veres needle then placed in the abdominal cavity.  The abdomen was 

appropriately insufflated with CO2 gas.  Veres needle removed and the 5 mm 

trocar was placed in abdominal cavity.  Placement confirmed with the camera.  

Attention was turned suprapubically where an 8 mm incision was made and the 8 mm

trocar was placed suprapubically in the midline under direct visualization.  

Trocar placed successfully and without difficulty.  Attention was first turned 

to assess in the abdomen and pelvis.  Findings noted above.  Attention turned to

the tubal ligation where a Filshie clip was applied to each tube.  Each clip 

encompassed the full width of the tube on each side and good hemostasis was 

noted afterwards on both sides.  At this point the abdomen was fully 

desufflated.  Trochars were removed.  Trocar sites were closed with 4-0 Vicryl 

in a subcuticular fashion followed by Marcaine.  The acorn uterine manipulator 

and single-tooth tenaculum were removed.  Procedure concluded at this point.  

Patient tolerated the procedure well.  All instrument lap counts were correct.  

Patient taken to the recovery room in stable condition.


Anesthesia: GETA


Surgeon: JOANNE BAHENA


Estimated blood loss: minimal


Pathology: none


Condition: stable


Disposition: PACU